# Patient Record
Sex: MALE | Race: WHITE | NOT HISPANIC OR LATINO | URBAN - NONMETROPOLITAN AREA
[De-identification: names, ages, dates, MRNs, and addresses within clinical notes are randomized per-mention and may not be internally consistent; named-entity substitution may affect disease eponyms.]

---

## 2019-06-12 ENCOUNTER — IMPORTED ENCOUNTER (OUTPATIENT)
Dept: URBAN - NONMETROPOLITAN AREA CLINIC 1 | Facility: CLINIC | Age: 44
End: 2019-06-12

## 2019-06-12 PROCEDURE — 92310 CONTACT LENS FITTING OU: CPT

## 2019-06-12 PROCEDURE — S0621 ROUTINE OPHTHALMOLOGICAL EXA: HCPCS

## 2019-06-12 NOTE — PATIENT DISCUSSION
Simple Myopia OU w/Incipient Presbyopia-  discussed findings w/patient-  new spectacle/CL Rx issued-  continue to monitor yearly or prn

## 2019-09-02 PROBLEM — H52.13: Noted: 2017-12-19

## 2019-09-02 PROBLEM — H52.4: Noted: 2019-09-02

## 2022-04-09 ASSESSMENT — VISUAL ACUITY
OS_SC: 20/20
OU_CC: 20/25
OD_SC: 20/20
OU_SC: 20/15

## 2022-04-09 ASSESSMENT — TONOMETRY
OD_IOP_MMHG: 16
OS_IOP_MMHG: 16